# Patient Record
Sex: FEMALE | Race: WHITE | NOT HISPANIC OR LATINO | URBAN - METROPOLITAN AREA
[De-identification: names, ages, dates, MRNs, and addresses within clinical notes are randomized per-mention and may not be internally consistent; named-entity substitution may affect disease eponyms.]

---

## 2020-09-01 ENCOUNTER — OFFICE VISIT (OUTPATIENT)
Dept: URBAN - METROPOLITAN AREA CLINIC 121 | Facility: CLINIC | Age: 75
End: 2020-09-01

## 2021-04-16 ENCOUNTER — OFFICE VISIT (OUTPATIENT)
Dept: URBAN - METROPOLITAN AREA CLINIC 63 | Facility: CLINIC | Age: 76
End: 2021-04-16

## 2021-05-24 ENCOUNTER — OFFICE VISIT (OUTPATIENT)
Dept: URBAN - METROPOLITAN AREA SURGERY CENTER 4 | Facility: SURGERY CENTER | Age: 76
End: 2021-05-24

## 2021-05-26 LAB — PATHOLOGY (INDENTED REPORT): (no result)

## 2021-06-02 ENCOUNTER — OFFICE VISIT (OUTPATIENT)
Dept: URBAN - METROPOLITAN AREA CLINIC 63 | Facility: CLINIC | Age: 76
End: 2021-06-02

## 2022-07-09 ENCOUNTER — TELEPHONE ENCOUNTER (OUTPATIENT)
Dept: URBAN - METROPOLITAN AREA CLINIC 121 | Facility: CLINIC | Age: 77
End: 2022-07-09

## 2022-07-09 RX ORDER — CLOBETASOL PROPIONATE 0.5 MG/G
OINTMENT TOPICAL
Refills: 0 | OUTPATIENT
Start: 2021-04-13 | End: 2021-04-16

## 2022-07-09 RX ORDER — PRAVASTATIN SODIUM 40 MG/1
TABLET ORAL ONCE A DAY
Refills: 0 | OUTPATIENT
Start: 2021-04-16 | End: 2021-06-02

## 2022-07-09 RX ORDER — MULTIVIT-MIN/FOLIC/VIT K/LYCOP 400-300MCG
TABLET ORAL ONCE A DAY
Refills: 0 | OUTPATIENT
Start: 2021-04-16 | End: 2021-06-02

## 2022-07-09 RX ORDER — MV-MIN/FOLIC/VIT K/LYCOP/COQ10 200-100MCG
CAPSULE ORAL ONCE A DAY
Refills: 0 | OUTPATIENT
Start: 2021-04-16 | End: 2021-06-02

## 2022-07-09 RX ORDER — ALBUTEROL SULFATE 2.5 MG/3ML
SOLUTION RESPIRATORY (INHALATION)
Refills: 0 | OUTPATIENT
Start: 2021-04-16 | End: 2021-06-02

## 2022-07-09 RX ORDER — PRAVASTATIN SODIUM 40 MG/1
TABLET ORAL
Refills: 0 | OUTPATIENT
Start: 2021-04-11 | End: 2021-04-16

## 2022-07-09 RX ORDER — OMEPRAZOLE 40 MG/1
ONCE A DAY CAPSULE, DELAYED RELEASE ORAL ONCE A DAY
Refills: 1 | OUTPATIENT
Start: 2021-04-16 | End: 2021-09-20

## 2022-07-09 RX ORDER — METOPROLOL SUCCINATE 25 MG/1
TABLET, EXTENDED RELEASE ORAL
Refills: 0 | OUTPATIENT
Start: 2021-02-06 | End: 2021-04-16

## 2022-07-09 RX ORDER — METOPROLOL SUCCINATE 25 MG/1
TABLET, EXTENDED RELEASE ORAL ONCE A DAY
Refills: 0 | OUTPATIENT
Start: 2021-04-16 | End: 2021-06-02

## 2022-07-09 RX ORDER — OMEPRAZOLE 40 MG/1
ONCE A DAY CAPSULE, DELAYED RELEASE ORAL ONCE A DAY
Refills: 1 | OUTPATIENT
Start: 2021-09-20 | End: 2022-03-02

## 2022-07-09 RX ORDER — NITROGLYCERIN 0.4 MG/1
PLACE 0.4 MG UNDER THE TONGUE EVERY 5 MINUTES AS NEEDED FOR CHEST PAIN. IF NO RELIEF AFTER 3RD DOSE, CALL 911 TABLET SUBLINGUAL AS NEEDED
Refills: 0 | OUTPATIENT
Start: 2021-04-16 | End: 2021-06-02

## 2022-07-09 RX ORDER — ASPIRIN 81 MG/1
TABLET, COATED ORAL ONCE A DAY
Refills: 0 | OUTPATIENT
Start: 2021-04-16 | End: 2021-06-02

## 2022-07-09 RX ORDER — OMEPRAZOLE 40 MG/1
ONCE A DAY CAPSULE, DELAYED RELEASE ORAL ONCE A DAY
Refills: 1 | OUTPATIENT
Start: 2021-04-16 | End: 2021-04-16

## 2022-07-09 RX ORDER — LOSARTAN POTASSIUM 25 MG/1
TABLET, FILM COATED ORAL ONCE A DAY
Refills: 0 | OUTPATIENT
Start: 2021-04-16 | End: 2021-06-02

## 2022-07-09 RX ORDER — CALCIUM CITRATE/VITAMIN D3 200-3.125
TABLET ORAL ONCE A DAY
Refills: 0 | OUTPATIENT
Start: 2021-04-16 | End: 2021-06-02

## 2022-07-09 RX ORDER — LOSARTAN POTASSIUM 25 MG/1
TABLET, FILM COATED ORAL
Refills: 0 | OUTPATIENT
Start: 2021-02-06 | End: 2021-04-16

## 2022-07-10 ENCOUNTER — TELEPHONE ENCOUNTER (OUTPATIENT)
Dept: URBAN - METROPOLITAN AREA CLINIC 121 | Facility: CLINIC | Age: 77
End: 2022-07-10

## 2022-07-10 RX ORDER — METOPROLOL SUCCINATE 25 MG/1
TABLET, EXTENDED RELEASE ORAL ONCE A DAY
Refills: 0 | Status: ACTIVE | COMMUNITY
Start: 2021-06-02

## 2022-07-10 RX ORDER — MULTIVIT-MIN/FOLIC/VIT K/LYCOP 400-300MCG
TABLET ORAL ONCE A DAY
Refills: 0 | Status: ACTIVE | COMMUNITY
Start: 2021-06-02

## 2022-07-10 RX ORDER — OMEPRAZOLE 40 MG/1
TAKE 1 CAPSULE BY MOUTH  ONCE DAILY CAPSULE, DELAYED RELEASE ORAL
Refills: 3 | Status: ACTIVE | COMMUNITY
Start: 2022-03-02

## 2022-07-10 RX ORDER — MV-MIN/FOLIC/VIT K/LYCOP/COQ10 200-100MCG
CAPSULE ORAL ONCE A DAY
Refills: 0 | Status: ACTIVE | COMMUNITY
Start: 2021-06-02

## 2022-07-10 RX ORDER — CLOBETASOL PROPIONATE 0.5 MG/G
APPLY TOPICALLY NIGHTLY. APPLY TO VULVA NIGHTLY X 2 WEEKS, FOLLOW W/ 3 TIMES A WEEK THEREAFTER OINTMENT TOPICAL TAKE AS DIRECTED
Refills: 0 | Status: ACTIVE | COMMUNITY
Start: 2021-04-16

## 2022-07-10 RX ORDER — NITROGLYCERIN 0.4 MG/1
PLACE 0.4 MG UNDER THE TONGUE EVERY 5 MINUTES AS NEEDED FOR CHEST PAIN. IF NO RELIEF AFTER 3RD DOSE, CALL 911 TABLET SUBLINGUAL AS NEEDED
Refills: 0 | Status: ACTIVE | COMMUNITY
Start: 2021-06-02

## 2022-07-10 RX ORDER — CALCIUM CITRATE/VITAMIN D3 200-3.125
TABLET ORAL ONCE A DAY
Refills: 0 | Status: ACTIVE | COMMUNITY
Start: 2021-06-02

## 2022-07-10 RX ORDER — LOSARTAN POTASSIUM 25 MG/1
TABLET, FILM COATED ORAL ONCE A DAY
Refills: 0 | Status: ACTIVE | COMMUNITY
Start: 2021-06-02

## 2022-07-10 RX ORDER — ASPIRIN 81 MG/1
TABLET, COATED ORAL ONCE A DAY
Refills: 0 | Status: ACTIVE | COMMUNITY
Start: 2021-06-02

## 2022-07-10 RX ORDER — PRAVASTATIN SODIUM 40 MG/1
TABLET ORAL ONCE A DAY
Refills: 0 | Status: ACTIVE | COMMUNITY
Start: 2021-06-02

## 2022-07-10 RX ORDER — ALBUTEROL SULFATE 2.5 MG/3ML
SOLUTION RESPIRATORY (INHALATION)
Refills: 0 | Status: ACTIVE | COMMUNITY
Start: 2021-06-02

## 2022-09-26 ENCOUNTER — OFFICE VISIT (OUTPATIENT)
Dept: URBAN - METROPOLITAN AREA CLINIC 63 | Facility: CLINIC | Age: 77
End: 2022-09-26
Payer: COMMERCIAL

## 2022-09-26 ENCOUNTER — WEB ENCOUNTER (OUTPATIENT)
Dept: URBAN - METROPOLITAN AREA CLINIC 63 | Facility: CLINIC | Age: 77
End: 2022-09-26

## 2022-09-26 VITALS
HEART RATE: 70 BPM | HEIGHT: 62 IN | TEMPERATURE: 96.7 F | WEIGHT: 172 LBS | DIASTOLIC BLOOD PRESSURE: 80 MMHG | BODY MASS INDEX: 31.65 KG/M2 | SYSTOLIC BLOOD PRESSURE: 130 MMHG | OXYGEN SATURATION: 97 %

## 2022-09-26 DIAGNOSIS — K22.4 ESOPHAGEAL DYSMOTILITY: ICD-10-CM

## 2022-09-26 DIAGNOSIS — C21.0 ANAL CANCER: ICD-10-CM

## 2022-09-26 PROBLEM — 266434009: Status: ACTIVE | Noted: 2022-09-26

## 2022-09-26 PROBLEM — 363490009: Status: ACTIVE | Noted: 2022-09-26

## 2022-09-26 PROCEDURE — 99213 OFFICE O/P EST LOW 20 MIN: CPT | Performed by: NURSE PRACTITIONER

## 2022-09-26 RX ORDER — ASPIRIN 81 MG/1
TABLET, COATED ORAL ONCE A DAY
Refills: 0 | Status: ACTIVE | COMMUNITY
Start: 2021-06-02

## 2022-09-26 RX ORDER — MV-MIN/FOLIC/VIT K/LYCOP/COQ10 200-100MCG
CAPSULE ORAL ONCE A DAY
Refills: 0 | Status: ACTIVE | COMMUNITY
Start: 2021-06-02

## 2022-09-26 RX ORDER — CALCIUM CITRATE/VITAMIN D3 200-3.125
TABLET ORAL ONCE A DAY
Refills: 0 | Status: ACTIVE | COMMUNITY
Start: 2021-06-02

## 2022-09-26 RX ORDER — CYANOCOBALAMIN 1000 UG/ML
AS DIRECTED INJECTION INTRAMUSCULAR; SUBCUTANEOUS
Status: ACTIVE | COMMUNITY

## 2022-09-26 RX ORDER — MULTIVIT-MIN/FOLIC/VIT K/LYCOP 400-300MCG
TABLET ORAL ONCE A DAY
Refills: 0 | Status: ACTIVE | COMMUNITY
Start: 2021-06-02

## 2022-09-26 RX ORDER — CLOBETASOL PROPIONATE 0.5 MG/G
APPLY TOPICALLY NIGHTLY. APPLY TO VULVA NIGHTLY X 2 WEEKS, FOLLOW W/ 3 TIMES A WEEK THEREAFTER OINTMENT TOPICAL TAKE AS DIRECTED
Refills: 0 | Status: ACTIVE | COMMUNITY
Start: 2021-04-16

## 2022-09-26 RX ORDER — LOSARTAN POTASSIUM 25 MG/1
TABLET, FILM COATED ORAL ONCE A DAY
Refills: 0 | Status: ACTIVE | COMMUNITY
Start: 2021-06-02

## 2022-09-26 RX ORDER — OMEPRAZOLE 40 MG/1
TAKE 1 CAPSULE BY MOUTH  ONCE DAILY CAPSULE, DELAYED RELEASE ORAL
Refills: 3 | Status: ACTIVE | COMMUNITY
Start: 2022-03-02

## 2022-09-26 RX ORDER — PRAVASTATIN SODIUM 40 MG/1
TABLET ORAL ONCE A DAY
Refills: 0 | Status: ACTIVE | COMMUNITY
Start: 2021-06-02

## 2022-09-26 RX ORDER — METOPROLOL SUCCINATE 25 MG/1
TABLET, EXTENDED RELEASE ORAL ONCE A DAY
Refills: 0 | Status: ACTIVE | COMMUNITY
Start: 2021-06-02

## 2022-09-26 RX ORDER — NITROGLYCERIN 0.4 MG/1
PLACE 0.4 MG UNDER THE TONGUE EVERY 5 MINUTES AS NEEDED FOR CHEST PAIN. IF NO RELIEF AFTER 3RD DOSE, CALL 911 TABLET SUBLINGUAL AS NEEDED
Refills: 0 | Status: ACTIVE | COMMUNITY
Start: 2021-06-02

## 2022-09-26 NOTE — HPI-PREVIOUS IMAGING
Barium swallow April 2021 minimal sliding hiatal hernia, mild nonspecific esophageal dysmotility, no obstruction or stricture. No aspiration.

## 2022-09-26 NOTE — HPI-PREVIOUS PROCEDURES
Endoscopy 5/2021, nonobstructing Schatzki's ring biopsies without specific histopathological findings, dilated with 54 [place] Steiner dilator, 2 cm hiatal hernia, duodenal bulb for second portion of the duodenum were normal. Patchy mild inflammation in the gastric body and antrum biopsies mild chronic gastritis negative for H. pylori.   Colonoscopy Dr. Smith 10/2019 good prep, rare diverticula, diminutivepolyp in the cecum pathology sessile serrated adenoma, radiation changes in the rectum and sigmoid colon.  Esophageal manometry 4/2010 findings were consistent with nonspecific esophageal motility disorder and recommended treatment with Cardizem if continued dysphagia.

## 2022-09-26 NOTE — HPI-TODAY'S VISIT:
Ms. 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 Castaneda (no longer taking her insurance) with last colonoscopy done by Dr Smith 10/2019  -plans on following up with her shortly, coronary artery disease with stent placements nabor 81 ASA no other blood thinners. , hypertension. Last cardiac cath 5/2020 without significant stenosis.  No current issues with dysphagia. Tolerating omeprazole 40mg daily though wishes to stop it if possible History of esophageal manometry study in 2010 findings consistent with nonspecific esophageal motility disorder.  Last year discussed calcium channel blockers.  She also is complaining of a postnasal drip and has plans for ENT evaluation soon.

## 2023-02-07 ENCOUNTER — ERX REFILL RESPONSE (OUTPATIENT)
Dept: URBAN - METROPOLITAN AREA CLINIC 63 | Facility: CLINIC | Age: 78
End: 2023-02-07

## 2023-02-07 RX ORDER — OMEPRAZOLE 40 MG/1
TAKE 1 CAPSULE BY MOUTH  ONCE DAILY CAPSULE, DELAYED RELEASE ORAL
Qty: 90 CAPSULE | Refills: 3 | OUTPATIENT

## 2023-02-07 RX ORDER — OMEPRAZOLE 40 MG/1
TAKE 1 CAPSULE BY MOUTH  ONCE DAILY CAPSULE, DELAYED RELEASE ORAL
Refills: 3 | OUTPATIENT

## 2023-12-01 ENCOUNTER — OFFICE VISIT (OUTPATIENT)
Dept: URBAN - METROPOLITAN AREA CLINIC 63 | Facility: CLINIC | Age: 78
End: 2023-12-01
Payer: COMMERCIAL

## 2023-12-01 VITALS
DIASTOLIC BLOOD PRESSURE: 82 MMHG | HEART RATE: 84 BPM | OXYGEN SATURATION: 96 % | TEMPERATURE: 97.2 F | BODY MASS INDEX: 32.02 KG/M2 | SYSTOLIC BLOOD PRESSURE: 134 MMHG | HEIGHT: 62 IN | WEIGHT: 174 LBS

## 2023-12-01 DIAGNOSIS — K52.9 CHRONIC DIARRHEA: ICD-10-CM

## 2023-12-01 DIAGNOSIS — Z85.048 HISTORY OF ANAL CANCER: ICD-10-CM

## 2023-12-01 DIAGNOSIS — R11.0 NAUSEA: ICD-10-CM

## 2023-12-01 PROBLEM — 112071000119105: Status: ACTIVE | Noted: 2023-12-01

## 2023-12-01 PROCEDURE — 99214 OFFICE O/P EST MOD 30 MIN: CPT | Performed by: NURSE PRACTITIONER

## 2023-12-01 RX ORDER — ASPIRIN 81 MG/1
TABLET, COATED ORAL ONCE A DAY
Refills: 0 | Status: ACTIVE | COMMUNITY
Start: 2021-06-02

## 2023-12-01 RX ORDER — CLOBETASOL PROPIONATE 0.5 MG/G
APPLY TOPICALLY NIGHTLY. APPLY TO VULVA NIGHTLY X 2 WEEKS, FOLLOW W/ 3 TIMES A WEEK THEREAFTER OINTMENT TOPICAL TAKE AS DIRECTED
Refills: 0 | Status: ACTIVE | COMMUNITY
Start: 2021-04-16

## 2023-12-01 RX ORDER — OMEPRAZOLE 40 MG/1
TAKE 1 CAPSULE BY MOUTH  ONCE DAILY CAPSULE, DELAYED RELEASE ORAL
Qty: 90 CAPSULE | Refills: 3 | Status: ACTIVE | COMMUNITY

## 2023-12-01 RX ORDER — PRAVASTATIN SODIUM 40 MG/1
TABLET ORAL ONCE A DAY
Refills: 0 | Status: ACTIVE | COMMUNITY
Start: 2021-06-02

## 2023-12-01 RX ORDER — LEVOTHYROXINE SODIUM 50 UG/1
1 TABLET IN THE MORNING ON AN EMPTY STOMACH TABLET ORAL ONCE A DAY
Status: ACTIVE | COMMUNITY

## 2023-12-01 RX ORDER — METOPROLOL SUCCINATE 25 MG/1
TABLET, EXTENDED RELEASE ORAL ONCE A DAY
Refills: 0 | Status: ACTIVE | COMMUNITY
Start: 2021-06-02

## 2023-12-01 RX ORDER — LOSARTAN POTASSIUM 25 MG/1
TABLET, FILM COATED ORAL ONCE A DAY
Refills: 0 | Status: ACTIVE | COMMUNITY
Start: 2021-06-02

## 2023-12-01 NOTE — HPI-MEDICAL HISTORY:
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 Castaneda (no longer taking her insurance) with last colonoscopy done by Dr Smith 10/2019 Last cardiac cath 5/2020 without significant stenosis. History of esophageal manometry study in 2010 findings consistent with nonspecific esophageal motility disorder. Last year discussed calcium channel blockers.

## 2023-12-01 NOTE — HPI-PREVIOUS PROCEDURES
Endoscopy 5/2021, nonobstructing Schatzki's ring biopsies without specific histopathological findings, dilated with 54 [place] Steiner dilator, 2 cm hiatal hernia, duodenal bulb for second portion of the duodenum were normal. Patchy mild inflammation in the gastric body and antrum biopsies mild chronic gastritis negative for H. pylori.   Colonoscopy Dr. Smith 10/2019 good prep, rare diverticula, diminutivepolyp in the cecum pathology sessile serrated adenoma, radiation changes in the rectum and sigmoid colon.  Esophageal manometry 4/2010 findings were consistent with nonspecific esophageal motility disorder and recommended treatment with Cardizem if continued dysphagia.

## 2023-12-01 NOTE — HPI-TODAY'S VISIT:
Having nausea 2 days per week at least and frequent diarrhea. Frequent yellow/tan stools. Diarrhea 3 days per week at least. Has urgency and always nervous about finding a bathroom. Frequent urge fecal incontinence, difficult to hold back the stool.

## 2023-12-24 LAB — PANCREATIC ELASTASE, FECAL: 194

## 2023-12-26 ENCOUNTER — TELEPHONE ENCOUNTER (OUTPATIENT)
Dept: URBAN - METROPOLITAN AREA CLINIC 63 | Facility: CLINIC | Age: 78
End: 2023-12-26

## 2024-02-02 ENCOUNTER — OV EP (OUTPATIENT)
Dept: URBAN - METROPOLITAN AREA CLINIC 63 | Facility: CLINIC | Age: 79
End: 2024-02-02
Payer: COMMERCIAL

## 2024-02-02 VITALS
TEMPERATURE: 97.5 F | DIASTOLIC BLOOD PRESSURE: 86 MMHG | BODY MASS INDEX: 32.94 KG/M2 | SYSTOLIC BLOOD PRESSURE: 138 MMHG | WEIGHT: 179 LBS | OXYGEN SATURATION: 97 % | HEIGHT: 62 IN | HEART RATE: 71 BPM

## 2024-02-02 DIAGNOSIS — R15.2 FECAL URGENCY: ICD-10-CM

## 2024-02-02 DIAGNOSIS — K62.7 RADIATION PROCTITIS: ICD-10-CM

## 2024-02-02 PROCEDURE — 99214 OFFICE O/P EST MOD 30 MIN: CPT | Performed by: NURSE PRACTITIONER

## 2024-02-02 RX ORDER — CLOBETASOL PROPIONATE 0.5 MG/G
APPLY TOPICALLY NIGHTLY. APPLY TO VULVA NIGHTLY X 2 WEEKS, FOLLOW W/ 3 TIMES A WEEK THEREAFTER OINTMENT TOPICAL TAKE AS DIRECTED
Refills: 0 | Status: ACTIVE | COMMUNITY
Start: 2021-04-16

## 2024-02-02 RX ORDER — OMEGA-3S/DHA/EPA/FISH OIL 1000-1400
AS DIRECTED CAPSULE,DELAYED RELEASE (ENTERIC COATED) ORAL
Status: ACTIVE | COMMUNITY

## 2024-02-02 RX ORDER — HYDROCORTISONE ACETATE 25 MG/1
1 SUPPOSITORY SUPPOSITORY RECTAL ONCE A DAY
Qty: 30 | Refills: 11 | OUTPATIENT
Start: 2024-02-02 | End: 2025-01-27

## 2024-02-02 RX ORDER — LOSARTAN POTASSIUM 25 MG/1
TABLET, FILM COATED ORAL ONCE A DAY
Refills: 0 | Status: ACTIVE | COMMUNITY
Start: 2021-06-02

## 2024-02-02 RX ORDER — LOPERAMIDE HCL 2 MG/1
1 TABLET AS NEEDED TABLET, FILM COATED ORAL
Status: ACTIVE | COMMUNITY

## 2024-02-02 RX ORDER — METOPROLOL SUCCINATE 50 MG/1
1 TABLET TABLET, FILM COATED, EXTENDED RELEASE ORAL ONCE A DAY
Refills: 0 | Status: ACTIVE | COMMUNITY
Start: 2021-06-02

## 2024-02-02 RX ORDER — LEVOTHYROXINE SODIUM 0.03 MG/1
TABLET ORAL
Qty: 90 TABLET | Status: ACTIVE | COMMUNITY

## 2024-02-02 RX ORDER — OMEPRAZOLE 40 MG/1
TAKE 1 CAPSULE BY MOUTH  ONCE DAILY CAPSULE, DELAYED RELEASE ORAL
Qty: 90 CAPSULE | Refills: 3 | Status: ACTIVE | COMMUNITY

## 2024-02-02 RX ORDER — ASPIRIN 81 MG/1
TABLET, COATED ORAL ONCE A DAY
Refills: 0 | Status: ACTIVE | COMMUNITY
Start: 2021-06-02

## 2024-02-02 RX ORDER — PRAVASTATIN SODIUM 40 MG/1
TABLET ORAL ONCE A DAY
Refills: 0 | Status: ACTIVE | COMMUNITY
Start: 2021-06-02

## 2024-02-02 NOTE — HPI-TODAY'S VISIT:
Now taking 1/2 tab imodium EOD and fiber gummies with good congrol. Still has some urgency. Diarrhea 3 days per week at least. Has urgency and always nervous about finding a bathroom. Urgency and incontinence much improved with fiber and imodium but still frequently urgent and causes problems for her due to limited mobility

## 2024-02-02 NOTE — HPI-PREVIOUS PROCEDURES
Endoscopy 5/2021, nonobstructing Schatzki's ring biopsies without specific histopathological findings, dilated with 54 [place] Steiner dilator, 2 cm hiatal hernia, duodenal bulb for second portion of the duodenum were normal. Patchy mild inflammation in the gastric body and antrum biopsies mild chronic gastritis negative for H. pylori.   Colonoscopy Dr. Smith 10/2019 good prep, rare diverticula, diminutivepolyp in the cecum pathology sessile serrated adenoma, radiation changes in the rectum and sigmoid colon.  Esophageal manometry 4/2010 findings were consistent with nonspecific esophageal motility disorder and recommended treatment with Cardizem if continued dysphagia.

## 2024-09-29 ENCOUNTER — ERX REFILL RESPONSE (OUTPATIENT)
Dept: URBAN - METROPOLITAN AREA CLINIC 63 | Facility: CLINIC | Age: 79
End: 2024-09-29

## 2024-09-29 RX ORDER — OMEPRAZOLE 40 MG/1
TAKE 1 CAPSULE BY MOUTH ONCE  DAILY CAPSULE, DELAYED RELEASE ORAL
Qty: 90 CAPSULE | Refills: 3 | OUTPATIENT

## 2024-09-29 RX ORDER — OMEPRAZOLE 40 MG/1
TAKE 1 CAPSULE BY MOUTH ONCE DAILY CAPSULE, DELAYED RELEASE ORAL ONCE A DAY
Qty: 90 CAPSULE | Refills: 2 | OUTPATIENT

## 2025-08-15 ENCOUNTER — P2P PATIENT RECORD (OUTPATIENT)
Age: 80
End: 2025-08-15